# Patient Record
Sex: MALE | Race: BLACK OR AFRICAN AMERICAN | NOT HISPANIC OR LATINO
[De-identification: names, ages, dates, MRNs, and addresses within clinical notes are randomized per-mention and may not be internally consistent; named-entity substitution may affect disease eponyms.]

---

## 2020-02-07 ENCOUNTER — APPOINTMENT (OUTPATIENT)
Dept: ORTHOPEDIC SURGERY | Facility: CLINIC | Age: 80
End: 2020-02-07
Payer: MEDICARE

## 2020-02-07 VITALS
DIASTOLIC BLOOD PRESSURE: 75 MMHG | BODY MASS INDEX: 25.76 KG/M2 | SYSTOLIC BLOOD PRESSURE: 147 MMHG | HEART RATE: 76 BPM | HEIGHT: 68 IN | WEIGHT: 170 LBS

## 2020-02-07 DIAGNOSIS — M79.644 PAIN IN RIGHT FINGER(S): ICD-10-CM

## 2020-02-07 DIAGNOSIS — S63.641A SPRAIN OF METACARPOPHALANGEAL JOINT OF RIGHT THUMB, INITIAL ENCOUNTER: ICD-10-CM

## 2020-02-07 DIAGNOSIS — S63.609A UNSPECIFIED SPRAIN OF UNSPECIFIED THUMB, INITIAL ENCOUNTER: ICD-10-CM

## 2020-02-07 PROCEDURE — 99214 OFFICE O/P EST MOD 30 MIN: CPT

## 2020-02-07 PROCEDURE — 73130 X-RAY EXAM OF HAND: CPT | Mod: RT

## 2020-02-07 PROCEDURE — 73501 X-RAY EXAM HIP UNI 1 VIEW: CPT | Mod: RT

## 2020-02-07 NOTE — DISCUSSION/SUMMARY
[de-identified] : The patient was advised of his findings. He will returnin one year for his annual total hip replacement evaluation.\par \par As far as his right thumb is concerned the patient states she is relatively pain-free at this time and will slowly progress his to the level of tolerance.\par Followup on an as needed basis

## 2020-02-07 NOTE — HISTORY OF PRESENT ILLNESS
[Stable] : stable [___ yrs] : [unfilled] year(s) ago [0] : a maximum pain level of 0/10 [de-identified] : Pt returns for his 2/2016  RIGHT THR follow up visit. PT has no complaints. Pt maintains his normal daily activity. Pt states he had a fall while bowling 2 weeks ago, has pain in his right thumb. Pt is right hand dominant.

## 2020-02-07 NOTE — PHYSICAL EXAM
[de-identified] : physical examination of the right hip discloses stable nontender range of motion Incision site well-healed, no defects or deformities, neuro/vascular status right lower extremity intact\par \par examination of the right thumb discloses stable nontender range of motion with slight tenderness to palpation at the metacarpal phalangeal joint region. No defects or deformities.No acute swelling. No Signs of instability [de-identified] : X-rays of the right Hip including AP view of the pelvisdisclose maintain integrity and position of the total hip implants.Signs of loosening or wearing\par X-rays of the right hand do not disclose any obvious fractures within the vicinity of the right thumb first metacarpal region

## 2021-02-08 ENCOUNTER — APPOINTMENT (OUTPATIENT)
Dept: ORTHOPEDIC SURGERY | Facility: CLINIC | Age: 81
End: 2021-02-08
Payer: MEDICARE

## 2021-02-08 VITALS
SYSTOLIC BLOOD PRESSURE: 127 MMHG | HEIGHT: 68.5 IN | WEIGHT: 169 LBS | OXYGEN SATURATION: 97 % | DIASTOLIC BLOOD PRESSURE: 72 MMHG | HEART RATE: 59 BPM | BODY MASS INDEX: 25.32 KG/M2

## 2021-02-08 DIAGNOSIS — Z96.641 PRESENCE OF RIGHT ARTIFICIAL HIP JOINT: ICD-10-CM

## 2021-02-08 DIAGNOSIS — Z00.00 ENCOUNTER FOR GENERAL ADULT MEDICAL EXAMINATION W/OUT ABNORMAL FINDINGS: ICD-10-CM

## 2021-02-08 PROCEDURE — 99213 OFFICE O/P EST LOW 20 MIN: CPT

## 2021-02-08 PROCEDURE — 73502 X-RAY EXAM HIP UNI 2-3 VIEWS: CPT | Mod: RT

## 2021-02-08 NOTE — REASON FOR VISIT
no body aches [Follow-Up Visit] : a follow-up visit for [FreeTextEntry2] : follow up yearly visit THR right

## 2021-02-08 NOTE — REVIEW OF SYSTEMS
[Negative] : Endocrine [Arthralgia] : no arthralgia [Joint Stiffness] : no joint stiffness [Joint Pain] : no joint pain [Joint Swelling] : no joint swelling

## 2021-02-08 NOTE — PHYSICAL EXAM
[de-identified] : Physical examination of the right hip discloses excellent stable functional range of motion nontender.  Incision site well-healed.  No acute effusions defects or deformities.  Neurovascular status right lower extremity intact. [de-identified] : X-rays taken of the right hip in AP and lateral projections as well as AP view of the pelvis disclose retained integrity of the total hip implants in good position.  No signs of acute wear or loosening.

## 2021-02-08 NOTE — DISCUSSION/SUMMARY
[de-identified] : Patient was informed of his findings and shown his x-rays.  He will maintain activity level to tolerance and will be reevaluated in 1 year to check his progress for his annual right total hip reassessment.

## 2021-02-08 NOTE — HISTORY OF PRESENT ILLNESS
[Stable] : stable [0] : a maximum pain level of 0/10 [de-identified] : Pt returns for follow up for his Right THR  surgical intervention 2016. Pt is here for his yearly follow up pt has no complaints.

## 2022-02-11 ENCOUNTER — APPOINTMENT (OUTPATIENT)
Dept: ORTHOPEDIC SURGERY | Facility: CLINIC | Age: 82
End: 2022-02-11
Payer: MEDICARE

## 2022-02-11 VITALS
OXYGEN SATURATION: 98 % | HEIGHT: 68.5 IN | HEART RATE: 79 BPM | DIASTOLIC BLOOD PRESSURE: 78 MMHG | BODY MASS INDEX: 25.32 KG/M2 | WEIGHT: 169 LBS | SYSTOLIC BLOOD PRESSURE: 150 MMHG

## 2022-02-11 DIAGNOSIS — Z96.641 PRESENCE OF RIGHT ARTIFICIAL HIP JOINT: ICD-10-CM

## 2022-02-11 PROCEDURE — 72170 X-RAY EXAM OF PELVIS: CPT

## 2022-02-11 PROCEDURE — 99213 OFFICE O/P EST LOW 20 MIN: CPT

## 2023-04-28 ENCOUNTER — APPOINTMENT (OUTPATIENT)
Dept: ORTHOPEDIC SURGERY | Facility: CLINIC | Age: 83
End: 2023-04-28
Payer: MEDICARE

## 2023-04-28 VITALS
BODY MASS INDEX: 25.32 KG/M2 | WEIGHT: 169 LBS | HEIGHT: 68.5 IN | SYSTOLIC BLOOD PRESSURE: 144 MMHG | DIASTOLIC BLOOD PRESSURE: 77 MMHG

## 2023-04-28 PROCEDURE — 73501 X-RAY EXAM HIP UNI 1 VIEW: CPT

## 2023-04-28 PROCEDURE — 72100 X-RAY EXAM L-S SPINE 2/3 VWS: CPT

## 2023-04-28 PROCEDURE — 99213 OFFICE O/P EST LOW 20 MIN: CPT

## 2023-04-28 RX ORDER — MELOXICAM 15 MG/1
15 TABLET ORAL
Qty: 30 | Refills: 1 | Status: ACTIVE | COMMUNITY
Start: 2023-04-28 | End: 1900-01-01

## 2024-04-26 ENCOUNTER — APPOINTMENT (OUTPATIENT)
Dept: ORTHOPEDIC SURGERY | Facility: CLINIC | Age: 84
End: 2024-04-26
Payer: MEDICARE

## 2024-04-26 VITALS
HEIGHT: 68 IN | SYSTOLIC BLOOD PRESSURE: 150 MMHG | DIASTOLIC BLOOD PRESSURE: 88 MMHG | OXYGEN SATURATION: 97 % | BODY MASS INDEX: 24.86 KG/M2 | WEIGHT: 164 LBS | HEART RATE: 82 BPM

## 2024-04-26 DIAGNOSIS — M47.816 SPONDYLOSIS W/OUT MYELOPATHY OR RADICULOPATHY, LUMBAR REGION: ICD-10-CM

## 2024-04-26 PROCEDURE — 99213 OFFICE O/P EST LOW 20 MIN: CPT

## 2024-04-26 PROCEDURE — 73502 X-RAY EXAM HIP UNI 2-3 VIEWS: CPT

## 2024-05-10 PROBLEM — S39.012A LUMBAR STRAIN, INITIAL ENCOUNTER: Status: ACTIVE | Noted: 2023-04-28

## 2024-05-13 ENCOUNTER — APPOINTMENT (OUTPATIENT)
Dept: ORTHOPEDIC SURGERY | Facility: CLINIC | Age: 84
End: 2024-05-13
Payer: MEDICARE

## 2024-05-13 VITALS
DIASTOLIC BLOOD PRESSURE: 80 MMHG | SYSTOLIC BLOOD PRESSURE: 131 MMHG | WEIGHT: 164 LBS | BODY MASS INDEX: 24.86 KG/M2 | HEIGHT: 68 IN

## 2024-05-13 DIAGNOSIS — S39.012A STRAIN OF MUSCLE, FASCIA AND TENDON OF LOWER BACK, INITIAL ENCOUNTER: ICD-10-CM

## 2024-05-13 PROCEDURE — 72082 X-RAY EXAM ENTIRE SPI 2/3 VW: CPT

## 2024-05-13 PROCEDURE — 99214 OFFICE O/P EST MOD 30 MIN: CPT

## 2024-05-13 RX ORDER — MELOXICAM 15 MG/1
15 TABLET ORAL DAILY
Qty: 14 | Refills: 0 | Status: ACTIVE | COMMUNITY
Start: 2024-05-13 | End: 1900-01-01

## 2024-05-13 NOTE — PHYSICAL EXAM
[de-identified] : Lumbar Physical Exam  Gait - Normal  Station - Normal  Sagittal balance - Normal  Compensatory mechanism? - None  Heel walk - Normal  Toe walk - Normal  Reflexes Patellar - normal Gastroc - normal Clonus - No  Hip Exam - Normal  Straight leg raise - none  Pulses - 2+ dp/pt  Range of motion - normal  Sensation  Sensation intact to light touch in L1, L2, L3, L4, L5 and S1 dermatomes bilaterally  Motor 	IP	Quad	HS	TA	Gastroc	EHL Right	5/5	5/5	5/5	5/5	5/5	5/5 Left	5/5	5/5	5/5	5/5	5/5	5/5 [de-identified] : Xray Scoliosis: SVA within normal limits L4-L5 spondylolisthesis noted

## 2024-05-13 NOTE — HISTORY OF PRESENT ILLNESS
[de-identified] : 84 yo male with complaint of low back pain. He has a history of a right LIV with Dr. Hooper in 2016.  Today the patient states that he is having some back pain as well as right posterior thigh posterior calf pain.  He states that it is worse in the morning.  It last for 10 minutes.  He has to walk with up pitched forward posture.  Eventually it does get better.  He states over the past week thankfully his symptoms have improved on their own.  He is here today to discuss neck steps in his care.

## 2024-06-10 ENCOUNTER — APPOINTMENT (OUTPATIENT)
Dept: ORTHOPEDIC SURGERY | Facility: CLINIC | Age: 84
End: 2024-06-10
Payer: MEDICARE

## 2024-06-10 VITALS
SYSTOLIC BLOOD PRESSURE: 144 MMHG | HEIGHT: 68 IN | BODY MASS INDEX: 24.86 KG/M2 | DIASTOLIC BLOOD PRESSURE: 76 MMHG | WEIGHT: 164 LBS

## 2024-06-10 DIAGNOSIS — M54.50 LOW BACK PAIN, UNSPECIFIED: ICD-10-CM

## 2024-06-10 PROCEDURE — 99214 OFFICE O/P EST MOD 30 MIN: CPT

## 2024-06-10 NOTE — ASSESSMENT
[FreeTextEntry1] : I had a lengthy discussion with the patient in regard to treatment plan and diagnosis. There are no red flag findings on imaging nor are there any red flag findings on clinical exams. Therefore, we will proceed with a course of conservative treatment. This would include physical therapy/home exercise program, Tylenol, NSAIDs as medically indicated. The patient will follow up with me prn. I encouraged the patient to follow-up sooner if there are any new or worsening symptoms.

## 2024-06-10 NOTE — PHYSICAL EXAM
[de-identified] : Lumbar Physical Exam  Gait - Normal  Station - Normal  Sagittal balance - Normal  Compensatory mechanism? - None  Heel walk - Normal  Toe walk - Normal  Reflexes Patellar - normal Gastroc - normal Clonus - No  Hip Exam - Normal  Straight leg raise - none  Pulses - 2+ dp/pt  Range of motion - normal  Sensation  Sensation intact to light touch in L1, L2, L3, L4, L5 and S1 dermatomes bilaterally  Motor 	IP	Quad	HS	TA	Gastroc	EHL Right	5/5	5/5	5/5	5/5	5/5	5/5 Left	5/5	5/5	5/5	5/5	5/5	5/5 [de-identified] : Xray Scoliosis: SVA within normal limits L4-L5 spondylolisthesis noted

## 2024-06-10 NOTE — ADDENDUM
[FreeTextEntry1] :  I, Irma Griffith, acted solely as a scribe for Dr. Oumar Herrera MD on this date 06/10/2024   All medical record entries made by the Scribe were at my, Dr. Oumar Herrera MD., direction and personally dictated by me on 06/10/2024. I have reviewed the chart and agree that the record accurately reflects my personal performance of the history, physical exam, assessment and plan. I have also personally directed, reviewed, and agreed with the chart.

## 2024-06-10 NOTE — HISTORY OF PRESENT ILLNESS
[de-identified] : Patient is an 83-year-old male who presents for f/u eval of lower bp. Patient denies any new or worsening symptoms. Continuing with home exercises.   05.13.24 82 yo male with complaint of low back pain. He has a history of a right LIV with Dr. Hooper in 2016.  Today the patient states that he is having some back pain as well as right posterior thigh posterior calf pain.  He states that it is worse in the morning.  It last for 10 minutes.  He has to walk with up pitched forward posture.  Eventually it does get better.  He states over the past week thankfully his symptoms have improved on their own.  He is here today to discuss neck steps in his care.

## 2024-11-20 NOTE — REASON FOR VISIT
75 minutes spent on total encounter. The necessity of the time spent during the encounter on this date of service was due to:     Obtaining separately reported history including review of hospital course, relevant imaging, therapy notes, and consultant notes  Performing medically appropriate examination  Counseling and educating patient/ family/caregivers  Care coordination  Communication with primary team  Documenting clinical information [Follow-Up Visit] : a follow-up visit for [Back Pain] : back pain -    Dispo: TBD - pending PRUCOL application     Recommendations and plan discussed with primary team - all questions answered.